# Patient Record
Sex: MALE | Race: WHITE
[De-identification: names, ages, dates, MRNs, and addresses within clinical notes are randomized per-mention and may not be internally consistent; named-entity substitution may affect disease eponyms.]

---

## 2018-01-01 ENCOUNTER — HOSPITAL ENCOUNTER (EMERGENCY)
Dept: HOSPITAL 11 - JP.ED | Age: 23
Discharge: HOME | End: 2018-01-01
Payer: COMMERCIAL

## 2018-01-01 DIAGNOSIS — K52.9: Primary | ICD-10-CM

## 2018-01-01 PROCEDURE — 99284 EMERGENCY DEPT VISIT MOD MDM: CPT

## 2018-01-01 NOTE — EDM.PDOC
ED HPI GENERAL MEDICAL PROBLEM





- General


Chief Complaint: Gastrointestinal Problem


Stated Complaint: THOWING UP/FLU-?


Time Seen by Provider: 01/01/18 13:35


Source of Information: Reports: Patient


History Limitations: Reports: No Limitations





- History of Present Illness


INITIAL COMMENTS - FREE TEXT/NARRATIVE: 





22-year-old male usually healthy started developing nausea and vomiting around 

1 AM, and since that time has had persistent emesis with occasional diarrhea 

and abdominal cramps. He is also running a low-grade fever. No shortness of 

breath or cough, no blood in the emesis or diarrhea. His girlfriend started 

developing symptoms about 2 hours later. He has not had any emesis for the last 

hour.


Onset: Gradual (Symptoms started around midnight last night)


Duration: Hour(s): (12 hours)


Severity: Mild


Associated Symptoms: Reports: Fever/Chills, Loss of Appetite, Malaise, Nausea/

Vomiting.  Denies: Headaches, Shortness of Breath


  ** Middle Abdominal


Pain Score (Numeric/FACES): 4





- Related Data


 Allergies











Allergy/AdvReac Type Severity Reaction Status Date / Time


 


No Known Allergies Allergy   Verified 01/01/18 13:52











Home Meds: 


 Home Meds





NK [No Known Home Meds]  01/01/18 [History]











Past Medical History





- Past Surgical History


Other HEENT Surgeries/Procedures: ORAL SURGERY





Social & Family History





- Tobacco Use


Smoking Status *Q: Unknown Ever Smoked





ED ROS GENERAL





- Review of Systems


Review Of Systems: See Below


Constitutional: Reports: Fever, Chills, Malaise, Weakness


HEENT: Reports: No Symptoms


Respiratory: Denies: Shortness of Breath


Cardiovascular: Denies: Chest Pain


GI/Abdominal: Reports: Abdominal Pain (Intermittent cramping), Diarrhea, Nausea

, Vomiting


: Reports: No Symptoms


Musculoskeletal: Denies: Muscle Pain


Neurological: Denies: Dizziness, Headache





ED EXAM, GI/ABD





- Physical Exam


Exam: See Below


Exam Limited By: No Limitations


General Appearance: Alert, No Apparent Distress (Looks uncomfortable but not 

distressed)


Eyes: Bilateral: Normal Appearance (No jaundice, normal hydration)


Throat/Mouth: Normal Inspection (Normal hydration)


Respiratory/Chest: No Respiratory Distress, Lungs Clear


Cardiovascular: Regular Rate, Rhythm


GI/Abdominal Exam: Abnormal Bowel Sounds (Hypoactive bowel sounds, no 

significant tenderness to palpation)





Course





- Vital Signs


Last Recorded V/S: 


 Last Vital Signs











Temp  100.0 F   01/01/18 14:54


 


Pulse  95   01/01/18 14:54


 


Resp  14   01/01/18 14:54


 


BP  124/84   01/01/18 14:54


 


Pulse Ox  98   01/01/18 14:54














- Orders/Labs/Meds


Meds: 


Medications














Discontinued Medications














Generic Name Dose Route Start Last Admin





  Trade Name Hank  PRN Reason Stop Dose Admin


 


Ondansetron HCl  4 mg  01/01/18 13:59  01/01/18 14:20





  Zofran Odt  PO  01/01/18 14:00  4 mg





  ONETIME ONE   Administration














- Re-Assessments/Exams


Free Text/Narrative Re-Assessment/Exam: 





01/01/18 14:10


Give the patient a sublingual Zofran, and allowed to sip on water for the next 

half hour. Reassured him that this is likely viral and will run its course 

within 12-24 hours.


01/01/18 14:57


Patient continued to sip water without any additional emesis. He was discharged 

with 5 additional doses of Zofran to use over the next 24 hours up to 3 times a 

day and can return if worsening.





Departure





- Departure


Time of Disposition: 15:10


Disposition: Home, Self-Care 01


Condition: Good


Clinical Impression: 


 Gastroenteritis








- Discharge Information


Instructions:  Viral Gastroenteritis, Adult, Easy-to-Read


Referrals: 


PCP,None [Primary Care Provider] - 


Forms:  ED Department Discharge


Care Plan Goals: 


Advance diet as tolerated starting with fluids. Increase activity as tolerated 

and return if worsening despite nausea treatment.